# Patient Record
Sex: FEMALE | Race: WHITE | NOT HISPANIC OR LATINO | URBAN - METROPOLITAN AREA
[De-identification: names, ages, dates, MRNs, and addresses within clinical notes are randomized per-mention and may not be internally consistent; named-entity substitution may affect disease eponyms.]

---

## 2020-09-19 ENCOUNTER — TELEPHONE (OUTPATIENT)
Dept: OTHER | Facility: OTHER | Age: 25
End: 2020-09-19

## 2020-09-19 NOTE — TELEPHONE ENCOUNTER
The patient was called for notification of a test result for COVID-19  The patient did not answer the phone and a voicemail was left requesting a call back to [phone], Option 7

## 2020-09-20 NOTE — TELEPHONE ENCOUNTER
The patient was called for notification of a test result for COVID-19  The patient did not answer the phone and a voicemail was left requesting a call back to [phone], Option 7

## 2021-11-14 ENCOUNTER — HOSPITAL ENCOUNTER (EMERGENCY)
Facility: HOSPITAL | Age: 26
Discharge: HOME | End: 2021-11-15
Attending: EMERGENCY MEDICINE
Payer: COMMERCIAL

## 2021-11-14 DIAGNOSIS — T40.601A OPIATE OVERDOSE, ACCIDENTAL OR UNINTENTIONAL, INITIAL ENCOUNTER (CMS/HCC): Primary | ICD-10-CM

## 2021-11-14 LAB
ANION GAP SERPL CALC-SCNC: 10 MEQ/L (ref 3–15)
APAP SERPL-MCNC: <10 UG/ML (ref 10–30)
BASOPHILS # BLD: 0.05 K/UL (ref 0.01–0.1)
BASOPHILS NFR BLD: 0.4 %
BUN SERPL-MCNC: 19 MG/DL (ref 8–20)
CALCIUM SERPL-MCNC: 9.6 MG/DL (ref 8.9–10.3)
CHLORIDE SERPL-SCNC: 102 MEQ/L (ref 98–109)
CO2 SERPL-SCNC: 24 MEQ/L (ref 22–32)
CREAT SERPL-MCNC: 1.1 MG/DL (ref 0.6–1.1)
DIFFERENTIAL METHOD BLD: ABNORMAL
EOSINOPHIL # BLD: 0.01 K/UL (ref 0.04–0.36)
EOSINOPHIL NFR BLD: 0.1 %
ERYTHROCYTE [DISTWIDTH] IN BLOOD BY AUTOMATED COUNT: 13.6 % (ref 11.7–14.4)
ETHANOL SERPL-MCNC: <5 MG/DL
GFR SERPL CREATININE-BSD FRML MDRD: 60 ML/MIN/1.73M*2
GLUCOSE SERPL-MCNC: 174 MG/DL (ref 70–99)
HCG UR QL: NEGATIVE
HCT VFR BLDCO AUTO: 40.9 % (ref 35–45)
HGB BLD-MCNC: 13 G/DL (ref 11.8–15.7)
IMM GRANULOCYTES # BLD AUTO: 0.03 K/UL (ref 0–0.08)
IMM GRANULOCYTES NFR BLD AUTO: 0.2 %
LYMPHOCYTES # BLD: 1.27 K/UL (ref 1.2–3.5)
LYMPHOCYTES NFR BLD: 10.5 %
MCH RBC QN AUTO: 27.1 PG (ref 28–33.2)
MCHC RBC AUTO-ENTMCNC: 31.8 G/DL (ref 32.2–35.5)
MCV RBC AUTO: 85.2 FL (ref 83–98)
MONOCYTES # BLD: 0.58 K/UL (ref 0.28–0.8)
MONOCYTES NFR BLD: 4.8 %
NEUTROPHILS # BLD: 10.21 K/UL (ref 1.7–7)
NEUTS SEG NFR BLD: 84 %
NRBC BLD-RTO: 0 %
PDW BLD AUTO: 10.8 FL (ref 9.4–12.3)
PLATELET # BLD AUTO: 279 K/UL (ref 150–369)
POTASSIUM SERPL-SCNC: 4.1 MEQ/L (ref 3.6–5.1)
RBC # BLD AUTO: 4.8 M/UL (ref 3.93–5.22)
SALICYLATES SERPL-MCNC: <4 MG/DL
SODIUM SERPL-SCNC: 136 MEQ/L (ref 136–144)
WBC # BLD AUTO: 12.15 K/UL (ref 3.8–10.5)

## 2021-11-14 PROCEDURE — 25800000 HC PHARMACY IV SOLUTIONS: Performed by: EMERGENCY MEDICINE

## 2021-11-14 PROCEDURE — 84703 CHORIONIC GONADOTROPIN ASSAY: CPT | Performed by: EMERGENCY MEDICINE

## 2021-11-14 PROCEDURE — 80048 BASIC METABOLIC PNL TOTAL CA: CPT | Performed by: EMERGENCY MEDICINE

## 2021-11-14 PROCEDURE — 3E0337Z INTRODUCTION OF ELECTROLYTIC AND WATER BALANCE SUBSTANCE INTO PERIPHERAL VEIN, PERCUTANEOUS APPROACH: ICD-10-PCS | Performed by: EMERGENCY MEDICINE

## 2021-11-14 PROCEDURE — G0480 DRUG TEST DEF 1-7 CLASSES: HCPCS | Performed by: EMERGENCY MEDICINE

## 2021-11-14 PROCEDURE — 99284 EMERGENCY DEPT VISIT MOD MDM: CPT | Mod: 25

## 2021-11-14 PROCEDURE — 85025 COMPLETE CBC W/AUTO DIFF WBC: CPT | Performed by: EMERGENCY MEDICINE

## 2021-11-14 PROCEDURE — 96361 HYDRATE IV INFUSION ADD-ON: CPT

## 2021-11-14 PROCEDURE — 36415 COLL VENOUS BLD VENIPUNCTURE: CPT | Performed by: EMERGENCY MEDICINE

## 2021-11-14 PROCEDURE — 96360 HYDRATION IV INFUSION INIT: CPT

## 2021-11-14 RX ORDER — BUSPIRONE HYDROCHLORIDE 10 MG/1
TABLET ORAL
COMMUNITY
Start: 2021-10-09

## 2021-11-14 RX ADMIN — SODIUM CHLORIDE 1000 ML: 9 INJECTION, SOLUTION INTRAVENOUS at 23:09

## 2021-11-15 VITALS
SYSTOLIC BLOOD PRESSURE: 93 MMHG | TEMPERATURE: 98.7 F | RESPIRATION RATE: 12 BRPM | BODY MASS INDEX: 29.99 KG/M2 | HEART RATE: 60 BPM | WEIGHT: 180 LBS | OXYGEN SATURATION: 97 % | DIASTOLIC BLOOD PRESSURE: 54 MMHG | HEIGHT: 65 IN

## 2021-11-15 ASSESSMENT — ENCOUNTER SYMPTOMS
WEAKNESS: 0
COUGH: 0
HEADACHES: 0
AGITATION: 0
SPEECH DIFFICULTY: 0
DIAPHORESIS: 0
SHORTNESS OF BREATH: 0
BACK PAIN: 0
DIFFICULTY URINATING: 0
DIARRHEA: 0
HEMATURIA: 0
NAUSEA: 0
NECK PAIN: 0
WHEEZING: 0
ACTIVITY CHANGE: 0
COLOR CHANGE: 0
FEVER: 0
ABDOMINAL PAIN: 0
SORE THROAT: 0
VOMITING: 0
FACIAL SWELLING: 0

## 2021-11-15 NOTE — ED PROVIDER NOTES
"Emergency Medicine Note  HPI   HISTORY OF PRESENT ILLNESS     26-year-old female with history of narcotic abuse sent here from [facility] for medical clearance.  Patient admits to heroin and crack use prior.  Was in [facility] for about 2 hours.  Patient states feels sleepy but otherwise denies any pain.  No nausea, vomiting, difficulty breathing.  No complaints otherwise.  Denies any suicidal thoughts.            Patient History   PAST HISTORY     Reviewed from Nursing Triage:      Past Medical History:   Diagnosis Date   • Substance abuse (CMS/[facility])        Past Surgical History:   Procedure Laterality Date   • APPENDECTOMY         History reviewed. No pertinent family history.    Social History     Tobacco Use   • Smoking status: Former Smoker   • Smokeless tobacco: Never Used   Substance Use Topics   • Alcohol use: Yes     Comment: occasional   • Drug use: Yes     Types: Fentanyl, \"Crack\" cocaine, Benzodiazepines         Review of Systems   REVIEW OF SYSTEMS     Review of Systems   Constitutional: Negative for activity change, diaphoresis and fever.   HENT: Negative for facial swelling and sore throat.    Eyes: Negative for visual disturbance.   Respiratory: Negative for cough, shortness of breath and wheezing.    Cardiovascular: Negative for chest pain and leg swelling.   Gastrointestinal: Negative for abdominal pain, diarrhea, nausea and vomiting.   Genitourinary: Negative for difficulty urinating and hematuria.   Musculoskeletal: Negative for back pain and neck pain.   Skin: Negative for color change and pallor.   Neurological: Negative for syncope, speech difficulty, weakness and headaches.   Psychiatric/Behavioral: Negative for agitation and behavioral problems.   All other systems reviewed and are negative.        VITALS     ED Vitals    Date/Time Temp Pulse Resp BP SpO2 [facility]   11/15/21 0045 -- 60 12 93/54 97 % VNB   11/14/21 2350 -- 70 14 115/78 95 % VNB   11/14/21 2330 -- 72 10 99/59 94 % VNB "   11/14/21 2254 37.1 °C (98.7 °F) 72 12 101/64 96 % VNB        Pulse Ox %: 96 % (11/15/21 0015)  Pulse Ox Interpretation: Normal (11/15/21 0015)           Physical Exam   PHYSICAL EXAM     Physical Exam  Vitals and nursing note reviewed.   Constitutional:       Appearance: She is well-developed.      Comments: Sleepy but easily arousable to voice.  Answers all questions appropriately.  Follows all commands.   HENT:      Head: Normocephalic and atraumatic.   Eyes:      Extraocular Movements: Extraocular movements intact.      Conjunctiva/sclera: Conjunctivae normal.      Pupils: Pupils are equal, round, and reactive to light.   Cardiovascular:      Rate and Rhythm: Normal rate and regular rhythm.   Pulmonary:      Effort: Pulmonary effort is normal.      Breath sounds: Normal breath sounds.   Abdominal:      General: There is no distension.      Palpations: Abdomen is soft. There is no mass.      Tenderness: There is no abdominal tenderness.   Musculoskeletal:         General: No tenderness or deformity. Normal range of motion.      Cervical back: Normal range of motion.   Skin:     General: Skin is warm and dry.   Neurological:      General: No focal deficit present.      Mental Status: She is alert.      Comments: Sleepy with associated mildly slurred speech but is able to converse.  Moves all extremities spontaneously.  Facial movements symmetric.   Psychiatric:         Behavior: Behavior normal.           PROCEDURES     Procedures     DATA     Results     Procedure Component Value Units Date/Time    ER toxicology screen, serum [[id number]]  (Abnormal) Collected: 11/14/21 2307    Specimen: Blood, Venous Updated: 11/14/21 2349     Salicylate <4.0 mg/dL      Acetaminophen <10.0 ug/mL      Ethanol <5 mg/dL     BhCG, Serum, Qual [[id number]]  (Normal) Collected: 11/14/21 2307    Specimen: Blood, Venous Updated: 11/14/21 2349     Preg Test, Serum Negative    Basic metabolic panel [[id number]]  (Abnormal) Collected:  11/14/21 2307    Specimen: Blood, Venous Updated: 11/14/21 2347     Sodium 136 mEQ/L      Potassium 4.1 mEQ/L      Comment: Results obtained on plasma. Plasma Potassium values may be up to 0.4 mEQ/L less than serum values. The differences may be greater for patients with high platelet or white cell counts.        Chloride 102 mEQ/L      CO2 24 mEQ/L      BUN 19 mg/dL      Creatinine 1.1 mg/dL      Glucose 174 mg/dL      Calcium 9.6 mg/dL      eGFR 60.0 mL/min/1.73m*2      Anion Gap 10 mEQ/L     CBC and differential [[id number]]  (Abnormal) Collected: 11/14/21 2307    Specimen: Blood, Venous Updated: 11/14/21 2337     WBC 12.15 K/uL      RBC 4.80 M/uL      Hemoglobin 13.0 g/dL      Hematocrit 40.9 %      MCV 85.2 fL      MCH 27.1 pg      MCHC 31.8 g/dL      RDW 13.6 %      Platelets 279 K/uL      MPV 10.8 fL      Differential Type Auto     nRBC 0.0 %      Immature Granulocytes 0.2 %      Neutrophils 84.0 %      Lymphocytes 10.5 %      Monocytes 4.8 %      Eosinophils 0.1 %      Basophils 0.4 %      Immature Granulocytes, Absolute 0.03 K/uL      Neutrophils, Absolute 10.21 K/uL      Lymphocytes, Absolute 1.27 K/uL      Monocytes, Absolute 0.58 K/uL      Eosinophils, Absolute 0.01 K/uL      Basophils, Absolute 0.05 K/uL     [place] Draw Panel [[id number]] Collected: 11/14/21 2307    Specimen: Blood, Venous Updated: 11/14/21 2327    Narrative:      The following orders were created for panel order [place] Draw Panel.  Procedure                               Abnormality         Status                     ---------                               -----------         ------                     RAINBOW RED[[id number]]                                      In process                 RAINBOW LT BLUE[[id number]]                                  In process                   Please view results for these tests on the individual orders.    RAINBOW RED [[id number]] Collected: 11/14/21 2307    Specimen: Blood, Venous Updated: 11/14/21 2327     CATY RAMAN [[id number]] Collected: 11/14/21 2307    Specimen: Blood, Venous Updated: 11/14/21 2327          Imaging Results    None         No orders to display       Scoring tools                                 ED Course & MDM   MDM / ED COURSE and CLINICAL IMPRESSIONS     MDM  Number of Diagnoses or Management Options  Diagnosis management comments: 26-year-old female here for accidental overdose.  Sent from [place] for medical clearance.  Will monitor here until clinically sober      ED Course as of 11/15/21 0224   Mon Nov 15, 2021   0048 Patient more awake. Called [facility] and they will send a  for her. [DW]      ED Course User Index  [DW] Yelitza Munoz MD         Clinical Impressions as of 11/15/21 0224   Opiate overdose, accidental or unintentional, initial encounter (CMS/[facility])            Yelitza Munoz MD  11/15/21 0224